# Patient Record
Sex: FEMALE | Race: WHITE | URBAN - METROPOLITAN AREA
[De-identification: names, ages, dates, MRNs, and addresses within clinical notes are randomized per-mention and may not be internally consistent; named-entity substitution may affect disease eponyms.]

---

## 2017-05-10 ENCOUNTER — OFFICE VISIT (OUTPATIENT)
Dept: OTOLARYNGOLOGY | Facility: OTHER | Age: 27
End: 2017-05-10
Payer: COMMERCIAL

## 2017-05-10 VITALS — OXYGEN SATURATION: 97 % | HEART RATE: 96 BPM

## 2017-05-10 DIAGNOSIS — R22.1 MASS OF NECK: Primary | ICD-10-CM

## 2017-05-10 PROCEDURE — 99203 OFFICE O/P NEW LOW 30 MIN: CPT | Performed by: OTOLARYNGOLOGY

## 2017-05-10 ASSESSMENT — PAIN SCALES - GENERAL: PAINLEVEL: MILD PAIN (2)

## 2017-05-10 NOTE — MR AVS SNAPSHOT
"              After Visit Summary   5/10/2017    Daysi Courtney    MRN: [id number]           Patient Information     Date Of Birth          1990        Visit Information        Provider Department      5/10/2017 8:30 AM Jean Claude Plata MD [facility]         Follow-ups after your visit        Who to contact     If you have questions or need follow up information about today's clinic visit or your schedule please contact [facility] directly at [phone].  Normal or non-critical lab and imaging results will be communicated to you by MyChart, letter or phone within 4 business days after the clinic has received the results. If you do not hear from us within 7 days, please contact the clinic through [facility] or phone. If you have a critical or abnormal lab result, we will notify you by phone as soon as possible.  Submit refill requests through [employer] or call your pharmacy and they will forward the refill request to us. Please allow 3 business days for your refill to be completed.          Additional Information About Your Visit        [place] Information     Pathflow lets you send messages to your doctor, view your test results, renew your prescriptions, schedule appointments and more. To sign up, go to [email or web address] . Click on \"Log in\" on the left side of the screen, which will take you to the Welcome page. Then click on \"Sign up Now\" on the right side of the page.     You will be asked to enter the access code listed below, as well as some personal information. Please follow the directions to create your username and password.     Your access code is: [email or web address]  Expires: 2017  8:58 AM     Your access code will  in 90 days. If you need help or a new code, please call your [facility] or [phone].        Care EveryWhere ID     This is your Care EveryWhere ID. This could be used by other organizations to access your [place] medical " records  [id number]        Your Vitals Were     Pulse Pulse Oximetry                96 97%           Blood Pressure from Last 3 Encounters:   No data found for BP    Weight from Last 3 Encounters:   No data found for Wt              Today, you had the following     No orders found for display       Primary Care Provider    None Specified       No primary provider on file.        Thank you!     Thank you for choosing [facility]  for your care. Our goal is always to provide you with excellent care. Hearing back from our patients is one way we can continue to improve our services. Please take a few minutes to complete the written survey that you may receive in the mail after your visit with us. Thank you!             Your Updated Medication List - Protect others around you: Learn how to safely use, store and throw away your medicines at www.disposemymeds.org.          This list is accurate as of: 5/10/17  8:58 AM.  Always use your most recent med list.                   Brand Name Dispense Instructions for use    NORTRIPTYLINE HCL PO

## 2017-05-10 NOTE — NURSING NOTE
Chief Complaint   Patient presents with     Consult     Swelling        Initial Pulse 96  SpO2 97% There is no height or weight on file to calculate BMI.  Medication Reconciliation: complete   CELSA Yee

## 2017-05-10 NOTE — PROGRESS NOTES
History of Present Illness - Daysi Courtney is a 26 year old female who presents for Swelling in her sub-mandible node, it did not go down with heat or antiinflammatories.  She has had this for 1 week.  She recently had a URI and still has postnasal drainage.  With the URI she had a sore throat.  The URI came and went over 1.5 months.  She has had some nausea with this illness.  Daysi has had a few pimples on her chin during this illness.      Soreness and inflammation has gone down a little since it started.        Past Medical History - History reviewed. No pertinent past medical history.    Current Medications -   Current Outpatient Prescriptions:      NORTRIPTYLINE HCL PO, , Disp: , Rfl:     Allergies - No Known Allergies    Social History -   Social History     Social History     Marital status: Single     Spouse name: N/A     Number of children: N/A     Years of education: N/A     Social History Main Topics     Smoking status: Not on file     Smokeless tobacco: Not on file     Alcohol use Not on file     Drug use: Not on file     Sexual activity: Not on file     Other Topics Concern     Not on file     Social History Narrative       Family History - History reviewed. No pertinent family history.    Review of Systems - As per HPI and PMHx, otherwise review of system review of the head and neck negative.    Physical Exam  Pulse 96  SpO2 97%  BMI: There is no height or weight on file to calculate BMI.    General - The patient is well nourished and well developed, and appears to have good nutritional status.  Alert and oriented to person and place, answers questions and cooperates with examination appropriately.    SKIN - No suspicious lesions or rashes.  Some pimples on the chin.  Respiration - No respiratory distress.     Head and Face - Normocephalic and atraumatic, with no gross asymmetry noted of the contour of the facial features.  The facial nerve is intact, with strong symmetric movements.    Voice and  Breathing - The patient was breathing comfortably without the use of accessory muscles. There was no wheezing, stridor, or stertor.  The patients voice was clear and strong, and had appropriate pitch and quality.    Ears - Bilateral pinna and EACs with normal appearing overlying skin. Tympanic membrane intact with good mobility on pneumatic otoscopy bilaterally. Bony landmarks of the ossicular chain are normal. The tympanic membranes are normal in appearance. No retraction, perforation, or masses.  No fluid or purulence was seen in the external canal or the middle ear.     Eyes - Extraocular movements intact.  Sclera were not icteric or injected, conjunctiva were pink and moist.    Mouth - Examination of the oral cavity showed pink, healthy oral mucosa. No lesions or ulcerations noted.  The tongue was mobile and midline, and the dentition were in good condition.      Throat - The walls of the oropharynx were smooth, pink, moist, symmetric, and had no lesions or ulcerations.  Tonsils are 2+. The tonsillar pillars and soft palate were symmetric.  The uvula was midline on elevation.    Neck - Normal midline excursion of the laryngotracheal complex during swallowing.  Full range of motion on passive movement.  Shoddy Submandibular is tender and enlarged.  The carotid pulse was palpable bilaterally.  Palpation of the thyroid was soft and smooth, with no nodules or goiter appreciated.  The trachea was mobile and midline.    Nose - External contour is symmetric, no gross deflection or scars.  Nasal mucosa is pink and moist with no abnormal mucus.  The septum was midline and non-obstructive, turbinates of normal size and position.  No polyps, masses, or purulence noted on examination.    Neuro - Nonfocal neuro exam is normal, CN 2 through 12 intact, normal gait and muscle tone.    Performed in clinic today:  No procedures preformed in clinic today.      A/P - Daysi Courtney is a 26 year old female Enlarged Lymph node.    I  advised her to continue with heat and Ibuprofen.  Since it is starting to reduced in size she will not need any antibiotics at this time.    She will return to clinic as needed.    Progress note was partially captured by Analia Nails MA and reviewed/addended by Dr. Plata for accuracy and content.        Jean Claude Plata MD

## 2017-12-28 NOTE — PROGRESS NOTES
History of Present Illness - Daysi Courtney is a 27 year old female presenting in clinic today for a recheck on medication refill.      Present Symptoms include: Sore throat, dry ears and they are unpredictable .  Daysi denies otolgia, tinnitis and vertigo.    She has very dry and itchy in her ears.  She has psoriasis in other places and thinks it is in her ears now too.      She gets canker sores and would like a refill of her medication for this.  Valtrex has been very helpful for herpetic sores.        Past Medical History - As above    Current Medications -   Current Outpatient Prescriptions:      NORTRIPTYLINE HCL PO, , Disp: , Rfl:     Allergies - No Known Allergies    Social History -   Social History     Social History     Marital status: Single     Spouse name: N/A     Number of children: N/A     Years of education: N/A     Social History Main Topics     Smoking status: Never Smoker     Smokeless tobacco: Not on file     Alcohol use No     Drug use: No     Sexual activity: Yes     Other Topics Concern     Not on file     Social History Narrative     No narrative on file       Family History - History Reviewed. No pertinent past history.     Review of Systems - As per HPI and PMHx, otherwise review of system review of the head and neck negative.    Physical Exam  Pulse 86  Resp 16  SpO2 97%  BMI: There is no height or weight on file to calculate BMI.    General - The patient is well nourished and well developed, and appears to have good nutritional status.  Alert and oriented to person and place, answers questions and cooperates with examination appropriately.    SKIN - No suspicious lesions or rashes.  Respiration - No respiratory distress.  Head and Face - Normocephalic and atraumatic, with no gross asymmetry noted of the contour of the facial features.  The facial nerve is intact, with strong symmetric movements.    Voice and Breathing - The patient was breathing comfortably without the use of accessory  muscles. There was no wheezing, stridor, or stertor.  The patients voice was clear and strong, and had appropriate pitch and quality.    Ears - Bilateral pinna and EACs with normal appearing overlying skin. Tympanic membrane intact with good mobility on pneumatic otoscopy bilaterally. Bony landmarks of the ossicular chain are normal. The tympanic membranes are normal in appearance. No retraction, perforation, or masses.  Ears are very dry in the Conchal bowl on both ears.    Eyes - Extraocular movements intact.  Sclera were not icteric or injected, conjunctiva were pink and moist.    Mouth - Examination of the oral cavity showed pink, healthy oral mucosa. No lesions or ulcerations noted.  The tongue was mobile and midline, and the dentition were in good condition.      Throat - The walls of the oropharynx were smooth, pink, moist, symmetric, and had no lesions or ulcerations.  The tonsillar pillars and soft palate were symmetric. Tonsils are   2+. The uvula was midline on elevation.    Neck - Normal midline excursion of the laryngotracheal complex during swallowing.  Full range of motion on passive movement.  Palpation of the occipital, submental, submandibular, internal jugular chain, and supraclavicular nodes did not demonstrate any abnormal lymph nodes or masses.  The carotid pulse was palpable bilaterally.  Palpation of the thyroid was soft and smooth, with no nodules or goiter appreciated.  The trachea was mobile and midline.    Nose - External contour is symmetric, no gross deflection or scars.  Nasal mucosa is pink and moist with no abnormal mucus.  The septum was midline and non-obstructive, turbinates of normal size and position.  No polyps, masses, or purulence noted on examination.    Neuro - Nonfocal neuro exam is normal, CN 2 through 12 intact, normal gait and muscle tone.      Performed in clinic today:  No procedures preformed in clinic today          A/P - Daysi Courtney is a 27 year old female with  Canker Sores and Psoriasis in her ears       1. Reoccurring Canker sores.  We will refill her Valtrex and give her one refill in addition to help her manage this.    2. Psoriasis in ears - I will prescribe Elocon for this.  She can use it as needed twice a day for the itching and dryness in her ears.     Daysi should follow up in as needed.      At Daysi next appointment they will not need a hearing test.    Progress note was partially captured by Analia Nails MA and reviewed/addended by Dr. Plata for accuracy and content.      Jean Claude Plata MD

## 2018-01-03 ENCOUNTER — OFFICE VISIT (OUTPATIENT)
Dept: OTOLARYNGOLOGY | Facility: OTHER | Age: 28
End: 2018-01-03
Payer: COMMERCIAL

## 2018-01-03 VITALS — RESPIRATION RATE: 16 BRPM | HEART RATE: 86 BPM | OXYGEN SATURATION: 97 %

## 2018-01-03 DIAGNOSIS — L40.9 PSORIASIS: ICD-10-CM

## 2018-01-03 DIAGNOSIS — K12.0 CANKER SORE: Primary | ICD-10-CM

## 2018-01-03 PROCEDURE — 99213 OFFICE O/P EST LOW 20 MIN: CPT | Performed by: OTOLARYNGOLOGY

## 2018-01-03 NOTE — NURSING NOTE
Chief Complaint   Patient presents with     RECHECK       Initial Pulse 86  Resp 16  SpO2 97% There is no height or weight on file to calculate BMI.  Medication Reconciliation: complete

## 2018-01-03 NOTE — LETTER
1/3/2018         RE: Daysi Courtney  [address]  [facility] [address]        Dear Colleague,    Thank you for referring your patient, Daysi Courtney, to the [facility]. Please see a copy of my visit note below.    History of Present Illness - Daysi Courtney is a 27 year old female presenting in clinic today for a recheck on medication refill.      Present Symptoms include: Sore throat, dry ears and they are unpredictable .  Daysi denies otolgia, tinnitis and vertigo.    She has very dry and itchy in her ears.  She has psoriasis in other places and thinks it is in her ears now too.      She gets canker sores and would like a refill of her medication for this.  Valtrex has been very helpful for herpetic sores.        Past Medical History - As above    Current Medications -   Current Outpatient Prescriptions:      NORTRIPTYLINE HCL PO, , Disp: , Rfl:     Allergies - No Known Allergies    Social History -   Social History     Social History     Marital status: Single     Spouse name: N/A     Number of children: N/A     Years of education: N/A     Social History Main Topics     Smoking status: Never Smoker     Smokeless tobacco: Not on file     Alcohol use No     Drug use: No     Sexual activity: Yes     Other Topics Concern     Not on file     Social History Narrative     No narrative on file       Family History - History Reviewed. No pertinent past history.     Review of Systems - As per HPI and PMHx, otherwise review of system review of the head and neck negative.    Physical Exam  Pulse 86  Resp 16  SpO2 97%  BMI: There is no height or weight on file to calculate BMI.    General - The patient is well nourished and well developed, and appears to have good nutritional status.  Alert and oriented to person and place, answers questions and cooperates with examination appropriately.    SKIN - No suspicious lesions or rashes.  Respiration - No respiratory distress.  Head and Face - Normocephalic  and atraumatic, with no gross asymmetry noted of the contour of the facial features.  The facial nerve is intact, with strong symmetric movements.    Voice and Breathing - The patient was breathing comfortably without the use of accessory muscles. There was no wheezing, stridor, or stertor.  The patients voice was clear and strong, and had appropriate pitch and quality.    Ears - Bilateral pinna and EACs with normal appearing overlying skin. Tympanic membrane intact with good mobility on pneumatic otoscopy bilaterally. Bony landmarks of the ossicular chain are normal. The tympanic membranes are normal in appearance. No retraction, perforation, or masses.  Ears are very dry in the Conchal bowl on both ears.    Eyes - Extraocular movements intact.  Sclera were not icteric or injected, conjunctiva were pink and moist.    Mouth - Examination of the oral cavity showed pink, healthy oral mucosa. No lesions or ulcerations noted.  The tongue was mobile and midline, and the dentition were in good condition.      Throat - The walls of the oropharynx were smooth, pink, moist, symmetric, and had no lesions or ulcerations.  The tonsillar pillars and soft palate were symmetric. Tonsils are   2+. The uvula was midline on elevation.    Neck - Normal midline excursion of the laryngotracheal complex during swallowing.  Full range of motion on passive movement.  Palpation of the occipital, submental, submandibular, internal jugular chain, and supraclavicular nodes did not demonstrate any abnormal lymph nodes or masses.  The carotid pulse was palpable bilaterally.  Palpation of the thyroid was soft and smooth, with no nodules or goiter appreciated.  The trachea was mobile and midline.    Nose - External contour is symmetric, no gross deflection or scars.  Nasal mucosa is pink and moist with no abnormal mucus.  The septum was midline and non-obstructive, turbinates of normal size and position.  No polyps, masses, or purulence noted on  examination.    Neuro - Nonfocal neuro exam is normal, CN 2 through 12 intact, normal gait and muscle tone.      Performed in clinic today:  No procedures preformed in clinic today          A/P - Daysi Courtney is a 27 year old female with Canker Sores and Psoriasis in her ears       1. Reoccurring Canker sores.  We will refill her Valtrex and give her one refill in addition to help her manage this.    2. Psoriasis in ears - I will prescribe Elocon for this.  She can use it as needed twice a day for the itching and dryness in her ears.     Daysi should follow up in as needed.      At Daysi next appointment they will not need a hearing test.    Progress note was partially captured by Analia Nails MA and reviewed/addended by Dr. Plata for accuracy and content.      Jean Claude Plata MD        Again, thank you for allowing me to participate in the care of your patient.        Sincerely,        Jean Claude Plata MD, MD

## 2018-01-04 RX ORDER — MOMETASONE FUROATE 1 MG/G
CREAM TOPICAL
Qty: 45 G | Refills: 2 | Status: SHIPPED | OUTPATIENT
Start: 2018-01-04

## 2018-01-04 RX ORDER — VALACYCLOVIR HYDROCHLORIDE 500 MG/1
500 TABLET, FILM COATED ORAL 2 TIMES DAILY
Qty: 20 TABLET | Refills: 1 | Status: SHIPPED | OUTPATIENT
Start: 2018-01-04 | End: 2018-07-09

## 2018-07-09 ENCOUNTER — OFFICE VISIT (OUTPATIENT)
Dept: OTOLARYNGOLOGY | Facility: CLINIC | Age: 28
End: 2018-07-09
Payer: COMMERCIAL

## 2018-07-09 VITALS — OXYGEN SATURATION: 99 % | HEART RATE: 120 BPM

## 2018-07-09 DIAGNOSIS — R22.0 LIP SWELLING: Primary | ICD-10-CM

## 2018-07-09 DIAGNOSIS — K12.0 CANKER SORE: ICD-10-CM

## 2018-07-09 DIAGNOSIS — K12.30 ORAL MUCOSITIS: ICD-10-CM

## 2018-07-09 PROCEDURE — 99213 OFFICE O/P EST LOW 20 MIN: CPT | Performed by: OTOLARYNGOLOGY

## 2018-07-09 RX ORDER — METHYLPREDNISOLONE 4 MG
TABLET, DOSE PACK ORAL
Qty: 21 TABLET | Refills: 0 | Status: SHIPPED | OUTPATIENT
Start: 2018-07-09

## 2018-07-09 RX ORDER — VALACYCLOVIR HYDROCHLORIDE 500 MG/1
500 TABLET, FILM COATED ORAL 2 TIMES DAILY
Qty: 20 TABLET | Refills: 2 | Status: SHIPPED | OUTPATIENT
Start: 2018-07-09 | End: 2019-03-05

## 2018-07-09 NOTE — PROGRESS NOTES
ENT Consultation    Daysi Courtney is a 27 year old female who is seen in consultation at the request of self referred.      History of Present Illness - Daysi Courtney is a 27 year old female here today with a swollen lip and lymph nodes.           BP Readings from Last 1 Encounters:   No data found for BP       Patient declined BP in clinic today    Daysi IS NOT a smoker/uses chewing tobacco.    Past Medical History - No past medical history on file.    Current Medications -   Current Outpatient Prescriptions:      mometasone (ELOCON) 0.1 % cream, Apply sparingly to affected area twice daily as needed.  Do not apply to face., Disp: 45 g, Rfl: 2     NORTRIPTYLINE HCL PO, , Disp: , Rfl:      valACYclovir (VALTREX) 500 MG tablet, Take 1 tablet (500 mg) by mouth 2 times daily, Disp: 20 tablet, Rfl: 1    Allergies - No Known Allergies    Social History -   Social History     Social History     Marital status: Single     Spouse name: N/A     Number of children: N/A     Years of education: N/A     Social History Main Topics     Smoking status: Never Smoker     Smokeless tobacco: Not on file     Alcohol use No     Drug use: No     Sexual activity: Yes     Other Topics Concern     Not on file     Social History Narrative       Family History - No family history on file.    Review of Systems - As per HPI and PMHx, otherwise review of system review of the head and neck negative.    Physical Exam  There were no vitals taken for this visit.  BMI: There is no height or weight on file to calculate BMI.    General - The patient is well nourished and well developed, and appears to have good nutritional status.  Alert and oriented to person and place, answers questions and cooperates with examination appropriately.    SKIN - No suspicious lesions or rashes.  Respiration - No respiratory distress.     Head and Face - Normocephalic and atraumatic, with no gross asymmetry noted of the contour of the facial features.  The facial nerve  is intact, with strong symmetric movements.    Voice and Breathing - The patient was breathing comfortably without the use of accessory muscles. There was no wheezing, stridor, or stertor.  The patients voice was clear and strong, and had appropriate pitch and quality.    Ears - Bilateral pinna and EACs with normal appearing overlying skin. Tympanic membrane intact with good mobility on pneumatic otoscopy bilaterally. Bony landmarks of the ossicular chain are normal. The tympanic membranes are normal in appearance. No retraction, perforation, or masses.  No fluid or purulence was seen in the external canal or the middle ear.     Eyes - Extraocular movements intact.  Sclera were not icteric or injected, conjunctiva were pink and moist.    Mouth - Examination of the oral cavity showed pink, healthy oral mucosa. No lesions or ulcerations noted.  The tongue was mobile and midline, and the dentition were in good condition.      Throat - The walls of the oropharynx were smooth, pink, moist, symmetric, and had no lesions or ulcerations.  The tonsillar pillars and soft palate were symmetric.  The uvula was midline on elevation.    Neck - Normal midline excursion of the laryngotracheal complex during swallowing.  Full range of motion on passive movement.  Palpation of the occipital, submental, submandibular, internal jugular chain, and supraclavicular nodes did not demonstrate any abnormal lymph nodes or masses.  The carotid pulse was palpable bilaterally.  Palpation of the thyroid was soft and smooth, with no nodules or goiter appreciated.  The trachea was mobile and midline.    Nose - External contour is symmetric, no gross deflection or scars.  Nasal mucosa is pink and moist with no abnormal mucus.  The septum was midline and non-obstructive, turbinates of normal size and position.  No polyps, masses, or purulence noted on examination.    Neuro - Nonfocal neuro exam is normal, CN 2 through 12 intact, normal gait and  muscle tone.    Performed in clinic today:  {Preformedtoday1:149621}          A/P - Daysi Courtney is a 27 year old female ***      Jean Claude Plata MD

## 2018-07-09 NOTE — MR AVS SNAPSHOT
After Visit Summary   7/9/2018    Daysi Courtney    MRN: [id number]           Patient Information     Date Of Birth          1990        Visit Information        Provider Department      7/9/2018 11:15 AM Jean Claude Plata MD [facility]        Today's Diagnoses     Lip swelling    -  1    Oral mucositis        Canker sore           Follow-ups after your visit        Who to contact     If you have questions or need follow up information about today's clinic visit or your schedule please contact [facility] directly at [phone].  Normal or non-critical lab and imaging results will be communicated to you by MyChart, letter or phone within 4 business days after the clinic has received the results. If you do not hear from us within 7 days, please contact the clinic through MyChart or phone. If you have a critical or abnormal lab result, we will notify you by phone as soon as possible.  Submit refill requests through [employer] or call your pharmacy and they will forward the refill request to us. Please allow 3 business days for your refill to be completed.          Additional Information About Your Visit        Care EveryWhere ID     This is your Care EveryWhere ID. This could be used by other organizations to access your [place] medical records  [id number]        Your Vitals Were     Pulse Pulse Oximetry                120 99%           Blood Pressure from Last 3 Encounters:   No data found for BP    Weight from Last 3 Encounters:   No data found for Wt              Today, you had the following     No orders found for display         Today's Medication Changes          These changes are accurate as of 7/9/18  1:12 PM.  If you have any questions, ask your nurse or doctor.               Start taking these medicines.        Dose/Directions    methylPREDNISolone 4 MG tablet   Commonly known as:  MEDROL DOSEPAK   Used for:  Oral mucositis, Lip swelling        Follow  package instructions   Quantity:  21 tablet   Refills:  0            Where to get your medicines      These medications were sent to [facility] [address] [facility] [address] [facility] [address]  [address] [address], [facility] [address]     Phone:  [phone]     methylPREDNISolone 4 MG tablet    valACYclovir 500 MG tablet                Primary Care Provider Office Phone # Fax #    [facility] [phone]       [address] [facility] [address]        Equal Access to Services     JADA SEVILLA : Hadii aad ku hadasho Soomaali, waaxda luqadaha, qaybta kaalmada adeegyada, waxay idiin hayaan nel beal . So [facility] [phone].    ATENCIÓN: Si habla español, tiene a armijo disposición servicios gratuitos de asistencia lingüística. [facility] [phone].    We comply with applicable federal civil rights laws and [place] laws. We do not discriminate on the basis of race, color, national origin, age, disability, sex, sexual orientation, or gender identity.            Thank you!     Thank you for choosing [facility]  for your care. Our goal is always to provide you with excellent care. Hearing back from our patients is one way we can continue to improve our services. Please take a few minutes to complete the written survey that you may receive in the mail after your visit with us. Thank you!             Your Updated Medication List - Protect others around you: Learn how to safely use, store and throw away your medicines at www.disposemymeds.org.          This list is accurate as of 7/9/18  1:12 PM.  Always use your most recent med list.                   Brand Name Dispense Instructions for use Diagnosis    methylPREDNISolone 4 MG tablet    MEDROL DOSEPAK    21 tablet    Follow package instructions    Oral mucositis, Lip swelling       mometasone 0.1 % cream    ELOCON    45 g    Apply sparingly to affected area twice daily as needed.  Do not apply to  face.    Psoriasis       NORTRIPTYLINE HCL PO           valACYclovir 500 MG tablet    VALTREX    20 tablet    Take 1 tablet (500 mg) by mouth 2 times daily    Canker sore

## 2018-07-09 NOTE — PROGRESS NOTES
ENT Consultation    Daysi Courtney is a 27 year old female who is seen in consultation at the request of self referred.      History of Present Illness - Daysi Courtney is a 27 year old female here today a swollen lip and lymph node x1 day.  She has had a lot of canker sores and previous localized angioedema.  Patient does have psoriasis and psoriatic process requiring otesla.  She experiences a little bit of discomfort on the left side lower lip with swelling.  Cheeks slightly swollen she has been biting it.      BP Readings from Last 1 Encounters:   No data found for BP           Past Medical History - No past medical history on file.    Current Medications -   Current Outpatient Prescriptions:      mometasone (ELOCON) 0.1 % cream, Apply sparingly to affected area twice daily as needed.  Do not apply to face., Disp: 45 g, Rfl: 2     NORTRIPTYLINE HCL PO, , Disp: , Rfl:      valACYclovir (VALTREX) 500 MG tablet, Take 1 tablet (500 mg) by mouth 2 times daily, Disp: 20 tablet, Rfl: 1    Allergies - No Known Allergies    Social History -   Social History     Social History     Marital status: Single     Spouse name: N/A     Number of children: N/A     Years of education: N/A     Social History Main Topics     Smoking status: Never Smoker     Smokeless tobacco: Not on file     Alcohol use No     Drug use: No     Sexual activity: Yes     Other Topics Concern     Not on file     Social History Narrative       Family History - No family history on file.    Review of Systems - As per HPI and PMHx, otherwise review of system review of the head and neck negative.    Physical Exam  Pulse 120  SpO2 99%  BMI: There is no height or weight on file to calculate BMI.    General - The patient is well nourished and well developed, and appears to have good nutritional status.  Alert and oriented to person and place, answers questions and cooperates with examination appropriately.    SKIN - No suspicious lesions or rashes.  Respiration  - No respiratory distress.     Head and Face - Normocephalic and atraumatic, with no gross asymmetry noted of the contour of the facial features.  The facial nerve is intact, with strong symmetric movements.    Voice and Breathing - The patient was breathing comfortably without the use of accessory muscles. There was no wheezing, stridor, or stertor.  The patients voice was clear and strong, and had appropriate pitch and quality.    Ears - Bilateral pinna and EACs with normal appearing overlying skin. Tympanic membrane intact with good mobility on pneumatic otoscopy bilaterally. Bony landmarks of the ossicular chain are normal. The tympanic membranes are normal in appearance. No retraction, perforation, or masses.  No fluid or purulence was seen in the external canal or the middle ear.     Eyes - Extraocular movements intact.  Sclera were not icteric or injected, conjunctiva were pink and moist.    Mouth - Examination of the oral cavity showed pink, healthy oral mucosa. No lesions or ulcerations noted.  The tongue was mobile and midline, and the dentition were in good condition.  Indeed the left lower lip somewhat more swollen.  Floor the mouth is clear.  Slight swelling of the buccal mucosa and the mucosa itself appears to be intact.    Throat - The walls of the oropharynx were smooth, pink, moist, symmetric, and had no lesions or ulcerations.  The tonsillar pillars and soft palate were symmetric.  The uvula was midline on elevation.    Neck - Normal midline excursion of the laryngotracheal complex during swallowing.  Full range of motion on passive movement.  Palpation of the occipital, submental, submandibular, internal jugular chain, and supraclavicular nodes  demonstrated mildly enlarged slightly tender left level 2 node but no other masses.  The carotid pulse was palpable bilaterally.  Palpation of the thyroid was soft and smooth, with no nodules or goiter appreciated.  The trachea was mobile and  midline.    Nose - External contour is symmetric, no gross deflection or scars.  Nasal mucosa is pink and moist with no abnormal mucus.  The septum was midline and non-obstructive, turbinates of normal size and position.  No polyps, masses, or purulence noted on examination.    Neuro - Nonfocal neuro exam is normal, CN 2 through 12 intact, normal gait and muscle tone.    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  She has a lot of allergies and sensitivities.  Tessalon may be predisposing her also to sound sensitivity and she has been in the sun.  At this point we given her Medrol Dosepak to reduce the edema.  Also regarding her Valtrex that she takes when she gets significant canker sores.  She will see us back as needed.      Jean Claude Plata MD

## 2018-07-09 NOTE — LETTER
7/9/2018         RE: Daysi Courtney  [address]  [facility] [address]        Dear Colleague,    Thank you for referring your patient, Daysi Courtney, to the [facility]. Please see a copy of my visit note below.    ENT Consultation    Daysi Courtney is a 27 year old female who is seen in consultation at the request of self referred.      History of Present Illness - Daysi Courtney is a 27 year old female here today with a swollen lip and lymph nodes.           BP Readings from Last 1 Encounters:   No data found for BP       Patient declined BP in clinic today    Daysi IS NOT a smoker/uses chewing tobacco.    Past Medical History - No past medical history on file.    Current Medications -   Current Outpatient Prescriptions:      mometasone (ELOCON) 0.1 % cream, Apply sparingly to affected area twice daily as needed.  Do not apply to face., Disp: 45 g, Rfl: 2     NORTRIPTYLINE HCL PO, , Disp: , Rfl:      valACYclovir (VALTREX) 500 MG tablet, Take 1 tablet (500 mg) by mouth 2 times daily, Disp: 20 tablet, Rfl: 1    Allergies - No Known Allergies    Social History -   Social History     Social History     Marital status: Single     Spouse name: N/A     Number of children: N/A     Years of education: N/A     Social History Main Topics     Smoking status: Never Smoker     Smokeless tobacco: Not on file     Alcohol use No     Drug use: No     Sexual activity: Yes     Other Topics Concern     Not on file     Social History Narrative       Family History - No family history on file.    Review of Systems - As per HPI and PMHx, otherwise review of system review of the head and neck negative.    Physical Exam  There were no vitals taken for this visit.  BMI: There is no height or weight on file to calculate BMI.    General - The patient is well nourished and well developed, and appears to have good nutritional status.  Alert and oriented to person and place, answers questions and cooperates with  examination appropriately.    SKIN - No suspicious lesions or rashes.  Respiration - No respiratory distress.     Head and Face - Normocephalic and atraumatic, with no gross asymmetry noted of the contour of the facial features.  The facial nerve is intact, with strong symmetric movements.    Voice and Breathing - The patient was breathing comfortably without the use of accessory muscles. There was no wheezing, stridor, or stertor.  The patients voice was clear and strong, and had appropriate pitch and quality.    Ears - Bilateral pinna and EACs with normal appearing overlying skin. Tympanic membrane intact with good mobility on pneumatic otoscopy bilaterally. Bony landmarks of the ossicular chain are normal. The tympanic membranes are normal in appearance. No retraction, perforation, or masses.  No fluid or purulence was seen in the external canal or the middle ear.     Eyes - Extraocular movements intact.  Sclera were not icteric or injected, conjunctiva were pink and moist.    Mouth - Examination of the oral cavity showed pink, healthy oral mucosa. No lesions or ulcerations noted.  The tongue was mobile and midline, and the dentition were in good condition.      Throat - The walls of the oropharynx were smooth, pink, moist, symmetric, and had no lesions or ulcerations.  The tonsillar pillars and soft palate were symmetric.  The uvula was midline on elevation.    Neck - Normal midline excursion of the laryngotracheal complex during swallowing.  Full range of motion on passive movement.  Palpation of the occipital, submental, submandibular, internal jugular chain, and supraclavicular nodes did not demonstrate any abnormal lymph nodes or masses.  The carotid pulse was palpable bilaterally.  Palpation of the thyroid was soft and smooth, with no nodules or goiter appreciated.  The trachea was mobile and midline.    Nose - External contour is symmetric, no gross deflection or scars.  Nasal mucosa is pink and moist with  no abnormal mucus.  The septum was midline and non-obstructive, turbinates of normal size and position.  No polyps, masses, or purulence noted on examination.    Neuro - Nonfocal neuro exam is normal, CN 2 through 12 intact, normal gait and muscle tone.    Performed in clinic today:  {Preformedtoday1:315223}          A/P - Daysi Courtney is a 27 year old female ***      Jean Claude Plata MD      ENT Consultation    Daysi Courtney is a 27 year old female who is seen in consultation at the request of self referred.      History of Present Illness - Daysi Courtney is a 27 year old female here today a swollen lip and lymph node x1 day.  She has had a lot of canker sores and previous localized angioedema.  Patient does have psoriasis and psoriatic process requiring otesla.  She experiences a little bit of discomfort on the left side lower lip with swelling.  Cheeks slightly swollen she has been biting it.      BP Readings from Last 1 Encounters:   No data found for BP           Past Medical History - No past medical history on file.    Current Medications -   Current Outpatient Prescriptions:      mometasone (ELOCON) 0.1 % cream, Apply sparingly to affected area twice daily as needed.  Do not apply to face., Disp: 45 g, Rfl: 2     NORTRIPTYLINE HCL PO, , Disp: , Rfl:      valACYclovir (VALTREX) 500 MG tablet, Take 1 tablet (500 mg) by mouth 2 times daily, Disp: 20 tablet, Rfl: 1    Allergies - No Known Allergies    Social History -   Social History     Social History     Marital status: Single     Spouse name: N/A     Number of children: N/A     Years of education: N/A     Social History Main Topics     Smoking status: Never Smoker     Smokeless tobacco: Not on file     Alcohol use No     Drug use: No     Sexual activity: Yes     Other Topics Concern     Not on file     Social History Narrative       Family History - No family history on file.    Review of Systems - As per HPI and PMHx, otherwise review of system review of  the head and neck negative.    Physical Exam  Pulse 120  SpO2 99%  BMI: There is no height or weight on file to calculate BMI.    General - The patient is well nourished and well developed, and appears to have good nutritional status.  Alert and oriented to person and place, answers questions and cooperates with examination appropriately.    SKIN - No suspicious lesions or rashes.  Respiration - No respiratory distress.     Head and Face - Normocephalic and atraumatic, with no gross asymmetry noted of the contour of the facial features.  The facial nerve is intact, with strong symmetric movements.    Voice and Breathing - The patient was breathing comfortably without the use of accessory muscles. There was no wheezing, stridor, or stertor.  The patients voice was clear and strong, and had appropriate pitch and quality.    Ears - Bilateral pinna and EACs with normal appearing overlying skin. Tympanic membrane intact with good mobility on pneumatic otoscopy bilaterally. Bony landmarks of the ossicular chain are normal. The tympanic membranes are normal in appearance. No retraction, perforation, or masses.  No fluid or purulence was seen in the external canal or the middle ear.     Eyes - Extraocular movements intact.  Sclera were not icteric or injected, conjunctiva were pink and moist.    Mouth - Examination of the oral cavity showed pink, healthy oral mucosa. No lesions or ulcerations noted.  The tongue was mobile and midline, and the dentition were in good condition.  Indeed the left lower lip somewhat more swollen.  Floor the mouth is clear.  Slight swelling of the buccal mucosa and the mucosa itself appears to be intact.    Throat - The walls of the oropharynx were smooth, pink, moist, symmetric, and had no lesions or ulcerations.  The tonsillar pillars and soft palate were symmetric.  The uvula was midline on elevation.    Neck - Normal midline excursion of the laryngotracheal complex during swallowing.  Full  range of motion on passive movement.  Palpation of the occipital, submental, submandibular, internal jugular chain, and supraclavicular nodes  demonstrated mildly enlarged slightly tender left level 2 node but no other masses.  The carotid pulse was palpable bilaterally.  Palpation of the thyroid was soft and smooth, with no nodules or goiter appreciated.  The trachea was mobile and midline.    Nose - External contour is symmetric, no gross deflection or scars.  Nasal mucosa is pink and moist with no abnormal mucus.  The septum was midline and non-obstructive, turbinates of normal size and position.  No polyps, masses, or purulence noted on examination.    Neuro - Nonfocal neuro exam is normal, CN 2 through 12 intact, normal gait and muscle tone.    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  She has a lot of allergies and sensitivities.  Tessalon may be predisposing her also to sound sensitivity and she has been in the sun.  At this point we given her Medrol Dosepak to reduce the edema.  Also regarding her Valtrex that she takes when she gets significant canker sores.  She will see us back as needed.      Jean Claude Plata MD      Again, thank you for allowing me to participate in the care of your patient.        Sincerely,        Jean Claude Plata MD, MD

## 2019-03-04 DIAGNOSIS — K12.0 CANKER SORE: ICD-10-CM

## 2019-03-05 RX ORDER — VALACYCLOVIR HYDROCHLORIDE 500 MG/1
500 TABLET, FILM COATED ORAL 2 TIMES DAILY
Qty: 20 TABLET | Refills: 2 | Status: SHIPPED | OUTPATIENT
Start: 2019-03-05